# Patient Record
Sex: MALE | Race: WHITE | HISPANIC OR LATINO | Employment: FULL TIME | URBAN - METROPOLITAN AREA
[De-identification: names, ages, dates, MRNs, and addresses within clinical notes are randomized per-mention and may not be internally consistent; named-entity substitution may affect disease eponyms.]

---

## 2022-05-29 ENCOUNTER — HOSPITAL ENCOUNTER (EMERGENCY)
Facility: OTHER | Age: 32
Discharge: HOME | End: 2022-05-29
Attending: EMERGENCY MEDICINE

## 2022-05-29 VITALS
WEIGHT: 167 LBS | OXYGEN SATURATION: 97 % | SYSTOLIC BLOOD PRESSURE: 142 MMHG | RESPIRATION RATE: 18 BRPM | DIASTOLIC BLOOD PRESSURE: 77 MMHG | TEMPERATURE: 98 F | HEART RATE: 90 BPM

## 2022-05-29 DIAGNOSIS — I10 UNCONTROLLED HYPERTENSION: Primary | ICD-10-CM

## 2022-05-29 DIAGNOSIS — R07.9 CHEST PAIN: ICD-10-CM

## 2022-05-29 DIAGNOSIS — R00.2 PALPITATIONS: ICD-10-CM

## 2022-05-29 LAB
ALBUMIN SERPL BCP-MCNC: 4.7 G/DL (ref 3.5–5.2)
ALP SERPL-CCNC: 90 U/L (ref 55–135)
ALT SERPL W/O P-5'-P-CCNC: 24 U/L (ref 10–44)
ANION GAP SERPL CALC-SCNC: 12 MMOL/L (ref 8–16)
AST SERPL-CCNC: 18 U/L (ref 10–40)
BASOPHILS # BLD AUTO: 0.02 K/UL (ref 0–0.2)
BASOPHILS NFR BLD: 0.2 % (ref 0–1.9)
BILIRUB SERPL-MCNC: 0.6 MG/DL (ref 0.1–1)
BUN SERPL-MCNC: 16 MG/DL (ref 6–20)
CALCIUM SERPL-MCNC: 10.3 MG/DL (ref 8.7–10.5)
CHLORIDE SERPL-SCNC: 105 MMOL/L (ref 95–110)
CO2 SERPL-SCNC: 25 MMOL/L (ref 23–29)
CREAT SERPL-MCNC: 0.8 MG/DL (ref 0.5–1.4)
CTP QC/QA: YES
DIFFERENTIAL METHOD: ABNORMAL
EOSINOPHIL # BLD AUTO: 0 K/UL (ref 0–0.5)
EOSINOPHIL NFR BLD: 0.1 % (ref 0–8)
ERYTHROCYTE [DISTWIDTH] IN BLOOD BY AUTOMATED COUNT: 12.2 % (ref 11.5–14.5)
EST. GFR  (AFRICAN AMERICAN): >60 ML/MIN/1.73 M^2
EST. GFR  (NON AFRICAN AMERICAN): >60 ML/MIN/1.73 M^2
GLUCOSE SERPL-MCNC: 116 MG/DL (ref 70–110)
HCT VFR BLD AUTO: 43.2 % (ref 40–54)
HGB BLD-MCNC: 14.7 G/DL (ref 14–18)
IMM GRANULOCYTES # BLD AUTO: 0.02 K/UL (ref 0–0.04)
IMM GRANULOCYTES NFR BLD AUTO: 0.2 % (ref 0–0.5)
LYMPHOCYTES # BLD AUTO: 1.3 K/UL (ref 1–4.8)
LYMPHOCYTES NFR BLD: 15.3 % (ref 18–48)
MCH RBC QN AUTO: 29.6 PG (ref 27–31)
MCHC RBC AUTO-ENTMCNC: 34 G/DL (ref 32–36)
MCV RBC AUTO: 87 FL (ref 82–98)
MONOCYTES # BLD AUTO: 0.5 K/UL (ref 0.3–1)
MONOCYTES NFR BLD: 6.3 % (ref 4–15)
NEUTROPHILS # BLD AUTO: 6.6 K/UL (ref 1.8–7.7)
NEUTROPHILS NFR BLD: 77.9 % (ref 38–73)
NRBC BLD-RTO: 0 /100 WBC
PLATELET # BLD AUTO: 258 K/UL (ref 150–450)
PMV BLD AUTO: 9.1 FL (ref 9.2–12.9)
POTASSIUM SERPL-SCNC: 4.2 MMOL/L (ref 3.5–5.1)
PROT SERPL-MCNC: 7.6 G/DL (ref 6–8.4)
RBC # BLD AUTO: 4.96 M/UL (ref 4.6–6.2)
SARS-COV-2 RDRP RESP QL NAA+PROBE: NEGATIVE
SODIUM SERPL-SCNC: 142 MMOL/L (ref 136–145)
TROPONIN I SERPL DL<=0.01 NG/ML-MCNC: <0.006 NG/ML (ref 0–0.03)
WBC # BLD AUTO: 8.45 K/UL (ref 3.9–12.7)

## 2022-05-29 PROCEDURE — 80053 COMPREHEN METABOLIC PANEL: CPT | Performed by: EMERGENCY MEDICINE

## 2022-05-29 PROCEDURE — 25000003 PHARM REV CODE 250: Performed by: EMERGENCY MEDICINE

## 2022-05-29 PROCEDURE — 93010 ELECTROCARDIOGRAM REPORT: CPT | Mod: ,,, | Performed by: INTERNAL MEDICINE

## 2022-05-29 PROCEDURE — 99285 EMERGENCY DEPT VISIT HI MDM: CPT | Mod: 25

## 2022-05-29 PROCEDURE — 84484 ASSAY OF TROPONIN QUANT: CPT | Performed by: EMERGENCY MEDICINE

## 2022-05-29 PROCEDURE — 85025 COMPLETE CBC W/AUTO DIFF WBC: CPT | Performed by: EMERGENCY MEDICINE

## 2022-05-29 PROCEDURE — 93010 EKG 12-LEAD: ICD-10-PCS | Mod: ,,, | Performed by: INTERNAL MEDICINE

## 2022-05-29 PROCEDURE — U0002 COVID-19 LAB TEST NON-CDC: HCPCS | Performed by: EMERGENCY MEDICINE

## 2022-05-29 PROCEDURE — 93005 ELECTROCARDIOGRAM TRACING: CPT

## 2022-05-29 RX ORDER — AMLODIPINE BESYLATE 5 MG/1
5 TABLET ORAL
Status: COMPLETED | OUTPATIENT
Start: 2022-05-29 | End: 2022-05-29

## 2022-05-29 RX ORDER — AMLODIPINE BESYLATE 5 MG/1
5 TABLET ORAL DAILY
Qty: 30 TABLET | Refills: 2 | Status: SHIPPED | OUTPATIENT
Start: 2022-05-29 | End: 2023-05-29

## 2022-05-29 RX ADMIN — AMLODIPINE BESYLATE 5 MG: 5 TABLET ORAL at 11:05

## 2022-05-29 NOTE — ED TRIAGE NOTES
"Pt arrived with c/o HA/dizziness and n/v x 2 days.  Pt states he had 3 beers two nights ago.  The next day woke with HA and dizziness that has persisted until today.  Pt reports multiple episodes of emesis since last night.  Reports waking up feeling "hot" and "heart racing."  Denies any heart racing throughout the day.  Pt reports concern for elevated BP x 2 days, states he was on amlodipine for 3 months about a year ago, but never got his rx refilled because his BP was well controlled.  BP at home approx. 150/90 for past 2 days.  aao x 4.  Answering questions appropriately.  Denies any CP or SOB.  "

## 2022-05-29 NOTE — ED PROVIDER NOTES
"SCRIBE #1 NOTE: I, Bisi Dawson, am scribing for, and in the presence of, Kaitlin Stevenson MD.         Source of History:  The Patient    Chief complaint:  Hypertension (Reports hx HTN; hasn't taken amlodipine in 7 months; reports HA, vomiting for past few days)      HPI:  Alvaro Escobar is a 31 y.o. male with a PMHx of HTN and HLD presenting with symptomatic hypertension. He reports his blood pressure has been elevated since 2 days ago after waking up "hungover from consuming 3 beers" the night before. He reports his systolic blood pressure was running at 148. He also reports dizziness and a headache localized behind his eyes, but denies blurry vision. He further reports vomiting and has been unable to tolerate PO. Patient further reports intermittent episodes of heart racing palpitations, but denies shortness of breath or chest pain. He was being prescribed Amlodipine late last year, but did not refill his Rx after he ran out, but he found one of his Amlodipine pills yesterday, which he took one for attempted relief. No other exacerbating or alleviating factors. No other associated symptoms. This is the extent to the patients complaints today here in the emergency department.     ROS: As per HPI and below:  General: No fever.  No chills.  Eyes: No visual changes.  ENT: No sore throat. No ear pain  Head: Notes headache.  Notes Dizziness.  Respiratory: No shortness of breath.  Cardiovascular: No chest pain.  Abdomen: No abdominal pain.  No nausea. Notes vomiting.  Genito-Urinary: No abnormal urination.  Neurologic: No focal weakness.  No numbness.  MSK: no back pain.  Integument: No rashes or lesions.  Hematologic: No easy bruising.  Endocrine: No excessive thirst or urination.      Review of patient's allergies indicates:   Allergen Reactions    Pork/porcine containing products      Pork food       PMH:  As per HPI and below:  No past medical history on file.  No past surgical history on file.         Physical Exam: "    BP (!) 142/77 (BP Location: Left arm, Patient Position: Lying)   Pulse 90   Temp 98.3 °F (36.8 °C) (Oral)   Resp 18   Wt 75.8 kg (167 lb)   SpO2 97%   Nursing note and vital signs reviewed.  Appearance: No acute distress.  Eyes: No conjunctival injection.  Neck: No deformity.   ENT: Oropharynx clear.  No stridor.   Chest/ Respiratory: Clear to auscultation bilaterally.  Good air movement.  No wheezes.  No rhonchi. No rales. No accessory muscle use.  Cardiovascular: Regular rate and rhythm.  No murmurs. No gallops. No rubs.  Abdomen: Soft.  Not distended.  Nontender.  No guarding.  No rebound. Non-peritoneal.  Musculoskeletal: Good range of motion all joints.  No deformities.  Neck supple.  No meningismus.  Skin: No rashes seen.  Good turgor.  No abrasions.  No ecchymoses.  Neurologic: Motor intact.  Sensation intact.  Cerebellar intact.  Cranial nerves intact.  Mental Status:  Alert and oriented x 3.  Appropriate, conversant.    EKG:  I independently reviewed and interpreted the EKG and my findings are as follows:   Normal sinus rhythm rate is 84, no ST abnormality, normal intervals    Imaging:  Imaging Results          CT Head Without Contrast (Final result)  Result time 05/29/22 12:45:58    Final result by Bryant Leyva MD (05/29/22 12:45:58)                 Impression:      1. No acute intracranial abnormalities.      Electronically signed by: Bryant Leyva MD  Date:    05/29/2022  Time:    12:45             Narrative:    EXAMINATION:  CT HEAD WITHOUT CONTRAST    CLINICAL HISTORY:  Headache, sudden, severe;    TECHNIQUE:  Low dose axial images were obtained through the head.  Coronal and sagittal reformations were also performed. Contrast was not administered.    COMPARISON:  None.    FINDINGS:  There is no evidence of acute major vascular territory infarct, hemorrhage, or mass.  There is no hydrocephalus.  There are no abnormal extra-axial fluid collections.  The paranasal sinuses and mastoid air  cells are clear, and there is no evidence of calvarial fracture.  The visualized soft tissues are unremarkable.                                  Labs:  Results for orders placed or performed during the hospital encounter of 05/29/22   CBC auto differential   Result Value Ref Range    WBC 8.45 3.90 - 12.70 K/uL    RBC 4.96 4.60 - 6.20 M/uL    Hemoglobin 14.7 14.0 - 18.0 g/dL    Hematocrit 43.2 40.0 - 54.0 %    MCV 87 82 - 98 fL    MCH 29.6 27.0 - 31.0 pg    MCHC 34.0 32.0 - 36.0 g/dL    RDW 12.2 11.5 - 14.5 %    Platelets 258 150 - 450 K/uL    MPV 9.1 (L) 9.2 - 12.9 fL    Immature Granulocytes 0.2 0.0 - 0.5 %    Gran # (ANC) 6.6 1.8 - 7.7 K/uL    Immature Grans (Abs) 0.02 0.00 - 0.04 K/uL    Lymph # 1.3 1.0 - 4.8 K/uL    Mono # 0.5 0.3 - 1.0 K/uL    Eos # 0.0 0.0 - 0.5 K/uL    Baso # 0.02 0.00 - 0.20 K/uL    nRBC 0 0 /100 WBC    Gran % 77.9 (H) 38.0 - 73.0 %    Lymph % 15.3 (L) 18.0 - 48.0 %    Mono % 6.3 4.0 - 15.0 %    Eosinophil % 0.1 0.0 - 8.0 %    Basophil % 0.2 0.0 - 1.9 %    Differential Method Automated    Comprehensive metabolic panel   Result Value Ref Range    Sodium 142 136 - 145 mmol/L    Potassium 4.2 3.5 - 5.1 mmol/L    Chloride 105 95 - 110 mmol/L    CO2 25 23 - 29 mmol/L    Glucose 116 (H) 70 - 110 mg/dL    BUN 16 6 - 20 mg/dL    Creatinine 0.8 0.5 - 1.4 mg/dL    Calcium 10.3 8.7 - 10.5 mg/dL    Total Protein 7.6 6.0 - 8.4 g/dL    Albumin 4.7 3.5 - 5.2 g/dL    Total Bilirubin 0.6 0.1 - 1.0 mg/dL    Alkaline Phosphatase 90 55 - 135 U/L    AST 18 10 - 40 U/L    ALT 24 10 - 44 U/L    Anion Gap 12 8 - 16 mmol/L    eGFR if African American >60 >60 mL/min/1.73 m^2    eGFR if non African American >60 >60 mL/min/1.73 m^2   Troponin I #1   Result Value Ref Range    Troponin I <0.006 0.000 - 0.026 ng/mL   POCT COVID-19 Rapid Screening   Result Value Ref Range    POC Rapid COVID Negative Negative     Acceptable Yes          Initial Impression  31 y.o. male with headache, vomiting, and with  elevated blood pressure. Will plan workup for hypertensive emergency, and restart hypertension medications. Will reassess.     Differential Dx:  Migraine headache, cluster headache, tension headache eye strain, and infectious causes such as meningitis, pharyngitis and sinusitis, other dangerous causes such as subarachnoid hemorrhage, tumor    MDM:    The patient is reassured that these symptoms do not appear to represent a serious or threatening condition.    ED Course as of 05/29/22 1430   Sun May 29, 2022   1304 Patient improved with treatment in the emergency department and comfortable going home. Discussed reasons to return and importance of followup.  Patient understands that the emergency visit today is primarily to address immediate concerns and to rule out emergent cause of symptoms and that they may require further workup and evaluation as an outpatient. All questions addressed and patient given discharge instructions and followup information.    [LF]      ED Course User Index  [LF] Kaitlin Stevenson MD                  Scribe Attestation:   Scribe #1: I performed the above scribed service and the documentation accurately describes the services I performed. I attest to the accuracy of the note.      Physician Attestation for Scribe: I, Kaitlin Stevenson MD, reviewed documentation as scribed in my presence, which is both accurate and complete.    Diagnostic Impression:    1. Uncontrolled hypertension    2. Palpitations    3. Chest pain         ED Disposition Condition    Discharge Stable          ED Prescriptions     Medication Sig Dispense Start Date End Date Auth. Provider    amLODIPine (NORVASC) 5 MG tablet Take 1 tablet (5 mg total) by mouth once daily. 30 tablet 5/29/2022 5/29/2023 Kaitlin Stevenson MD        Follow-up Information    None            Kaitlin Stevenson MD  05/29/22 1430